# Patient Record
Sex: FEMALE | Race: OTHER | Employment: FULL TIME | URBAN - METROPOLITAN AREA
[De-identification: names, ages, dates, MRNs, and addresses within clinical notes are randomized per-mention and may not be internally consistent; named-entity substitution may affect disease eponyms.]

---

## 2019-05-31 ENCOUNTER — OFFICE VISIT (OUTPATIENT)
Dept: ENDOCRINOLOGY CLINIC | Facility: CLINIC | Age: 54
End: 2019-05-31
Payer: COMMERCIAL

## 2019-05-31 ENCOUNTER — APPOINTMENT (OUTPATIENT)
Dept: LAB | Facility: HOSPITAL | Age: 54
End: 2019-05-31
Attending: INTERNAL MEDICINE
Payer: COMMERCIAL

## 2019-05-31 VITALS — DIASTOLIC BLOOD PRESSURE: 77 MMHG | WEIGHT: 148 LBS | SYSTOLIC BLOOD PRESSURE: 110 MMHG | HEART RATE: 83 BPM

## 2019-05-31 DIAGNOSIS — E11.65 UNCONTROLLED TYPE 2 DIABETES MELLITUS WITH HYPERGLYCEMIA (HCC): ICD-10-CM

## 2019-05-31 DIAGNOSIS — E11.65 UNCONTROLLED TYPE 2 DIABETES MELLITUS WITH HYPERGLYCEMIA (HCC): Primary | ICD-10-CM

## 2019-05-31 PROCEDURE — 82570 ASSAY OF URINE CREATININE: CPT

## 2019-05-31 PROCEDURE — 84443 ASSAY THYROID STIM HORMONE: CPT

## 2019-05-31 PROCEDURE — 99244 OFF/OP CNSLTJ NEW/EST MOD 40: CPT | Performed by: INTERNAL MEDICINE

## 2019-05-31 PROCEDURE — 82043 UR ALBUMIN QUANTITATIVE: CPT

## 2019-05-31 PROCEDURE — 83036 HEMOGLOBIN GLYCOSYLATED A1C: CPT | Performed by: INTERNAL MEDICINE

## 2019-05-31 PROCEDURE — 82962 GLUCOSE BLOOD TEST: CPT | Performed by: INTERNAL MEDICINE

## 2019-05-31 PROCEDURE — 36416 COLLJ CAPILLARY BLOOD SPEC: CPT | Performed by: INTERNAL MEDICINE

## 2019-05-31 PROCEDURE — 80053 COMPREHEN METABOLIC PANEL: CPT

## 2019-05-31 PROCEDURE — 80061 LIPID PANEL: CPT

## 2019-05-31 PROCEDURE — 36415 COLL VENOUS BLD VENIPUNCTURE: CPT

## 2019-05-31 RX ORDER — LANCETS 33 GAUGE
EACH MISCELLANEOUS
Qty: 100 EACH | Refills: 3 | Status: SHIPPED | OUTPATIENT
Start: 2019-05-31 | End: 2019-06-07

## 2019-05-31 RX ORDER — SITAGLIPTIN 100 MG/1
100 TABLET, FILM COATED ORAL DAILY
Refills: 0 | COMMUNITY
Start: 2019-03-22 | End: 2019-05-31

## 2019-05-31 RX ORDER — METFORMIN HYDROCHLORIDE 500 MG/1
500 TABLET, EXTENDED RELEASE ORAL 2 TIMES DAILY WITH MEALS
Qty: 60 TABLET | Refills: 5 | Status: SHIPPED | OUTPATIENT
Start: 2019-05-31

## 2019-05-31 RX ORDER — BLOOD SUGAR DIAGNOSTIC
STRIP MISCELLANEOUS
Qty: 200 STRIP | Refills: 2 | Status: SHIPPED | OUTPATIENT
Start: 2019-05-31

## 2019-05-31 RX ORDER — GLIMEPIRIDE 4 MG/1
4 TABLET ORAL
Qty: 30 TABLET | Refills: 5 | Status: SHIPPED | OUTPATIENT
Start: 2019-05-31 | End: 2019-12-10

## 2019-05-31 RX ORDER — BLOOD SUGAR DIAGNOSTIC
STRIP MISCELLANEOUS
Qty: 100 STRIP | Refills: 1 | Status: SHIPPED | OUTPATIENT
Start: 2019-05-31 | End: 2019-06-07

## 2019-05-31 NOTE — PATIENT INSTRUCTIONS
STOP current medications    Start Invokana 300mg daily     Start Glimepiride 4mg daily    Start Metformin ER 500mg 2 times per day    Start Ozempic 0.25mg SQ weekly for one month then increase to 0.5mg SQ weekly     Follow up 6 weeks with nurse jana

## 2019-05-31 NOTE — PROGRESS NOTES
Name: Johana Simms  Date: 5/31/2019    Referring Physician: No ref. provider found    HISTORY OF PRESENT ILLNESS   Johana Simms is a 49year old female who presents for diabetes mellitus, diagnosed in 2004. She was previously treated by Dr. Mana Cadena.       TRIPP reviewed. No pertinent surgical history. PHYSICAL EXAM  /77   Pulse 83   Wt 148 lb (67.1 kg)     General Appearance:  alert, well developed, in no acute distress  Eyes:  normal conjunctivae, sclera. , normal sclera and normal pupils  Ears/Nose/Mo

## 2019-06-04 ENCOUNTER — TELEPHONE (OUTPATIENT)
Dept: ENDOCRINOLOGY CLINIC | Facility: CLINIC | Age: 54
End: 2019-06-04

## 2019-06-04 NOTE — TELEPHONE ENCOUNTER
Please call patient - labs demonstrate significant elevation of cholesterol levels. Please start atorvastatin 40mg PO QHS, #30, refill 6. The other labs were normal including normal kidney and liver function. Normal thyroid function. Thanks.

## 2019-06-07 ENCOUNTER — TELEPHONE (OUTPATIENT)
Dept: ENDOCRINOLOGY CLINIC | Facility: CLINIC | Age: 54
End: 2019-06-07

## 2019-06-07 RX ORDER — LANCETS 33 GAUGE
EACH MISCELLANEOUS
Qty: 200 EACH | Refills: 3 | Status: SHIPPED | OUTPATIENT
Start: 2019-06-07

## 2019-06-07 RX ORDER — BLOOD SUGAR DIAGNOSTIC
STRIP MISCELLANEOUS
Qty: 200 STRIP | Refills: 3 | Status: SHIPPED | OUTPATIENT
Start: 2019-06-07

## 2019-06-17 ENCOUNTER — HOSPITAL ENCOUNTER (OUTPATIENT)
Dept: ENDOCRINOLOGY | Facility: HOSPITAL | Age: 54
Discharge: HOME | End: 2019-06-17
Attending: INTERNAL MEDICINE
Payer: COMMERCIAL

## 2019-06-17 VITALS — WEIGHT: 151.69 LBS

## 2019-06-17 NOTE — PROGRESS NOTES
Caitlin Prabhakarfaith  : 1965 attended individual initial assessment for Diabetes Education:    Date: 2019   Start time: 174 End time: 1615      Assessment: Caitlin Whatley presents with referral from endocrinologist. Aryan Whittington recently seen by diann and was Initiated goal setting process and will continue to refine throughout class series. Recommendations:      Monitor blood glucose as directed. Follow Basic Diet Guidelines.    Attend Group Class series (4 classes)    Written materials provided for all are

## 2019-06-24 ENCOUNTER — TELEPHONE (OUTPATIENT)
Dept: ENDOCRINOLOGY CLINIC | Facility: CLINIC | Age: 54
End: 2019-06-24

## 2019-06-24 RX ORDER — ATORVASTATIN CALCIUM 40 MG/1
40 TABLET, FILM COATED ORAL NIGHTLY
Qty: 30 TABLET | Refills: 6 | Status: SHIPPED | OUTPATIENT
Start: 2019-06-24

## 2019-06-24 NOTE — TELEPHONE ENCOUNTER
Pt states she is at work and is unable to send BG update now. Pt will try to send BGs tonight via [address]. Pt states this morning Bf . RN advised should be below 120. Will await BG log from pt.

## 2019-06-24 NOTE — TELEPHONE ENCOUNTER
RN spoke with patient about note below from provider. Patient verbalized understanding tp start medication and does not have further questions at this time. Pt has apt w/ LG in July as well.

## 2019-06-24 NOTE — TELEPHONE ENCOUNTER
Please call patient - received note from educator that her sugars are still above goal.  Obtain readings to adjust medications. Thanks.

## 2019-06-26 ENCOUNTER — APPOINTMENT (OUTPATIENT)
Dept: ENDOCRINOLOGY | Facility: HOSPITAL | Age: 54
End: 2019-06-26
Attending: INTERNAL MEDICINE
Payer: COMMERCIAL

## 2019-06-27 NOTE — TELEPHONE ENCOUNTER
Pt states she will send sugars tonight. She states Bf  and Bd . RN advised to watch her bedtime snack - perhaps try low carb snack instead of fruit to see if morning sugars are less. Pt states she will try and let us know.     Will await BG log

## 2019-06-29 NOTE — TELEPHONE ENCOUNTER
Sugars are actually significantly improved compared to her education visit a few weeks ago. Also her medications are at max dose so lets continue current plan. Please schedule her for follow up in August or September. Thanks.

## 2019-07-01 NOTE — TELEPHONE ENCOUNTER
Spoke w/ Mira Collins. She is agreeable to CMP. She will call if BGs every start to increase on current medications.  Booked f/u Sept. 11.

## 2019-08-07 ENCOUNTER — APPOINTMENT (OUTPATIENT)
Dept: ENDOCRINOLOGY | Facility: HOSPITAL | Age: 54
End: 2019-08-07
Attending: INTERNAL MEDICINE
Payer: COMMERCIAL

## 2019-09-04 ENCOUNTER — HOSPITAL ENCOUNTER (OUTPATIENT)
Dept: ENDOCRINOLOGY | Facility: HOSPITAL | Age: 54
Discharge: HOME | End: 2019-09-04
Attending: INTERNAL MEDICINE
Payer: COMMERCIAL

## 2019-09-04 VITALS — WEIGHT: 150 LBS

## 2019-09-04 DIAGNOSIS — E11.65 UNCONTROLLED TYPE 2 DIABETES MELLITUS WITH HYPERGLYCEMIA (HCC): Primary | ICD-10-CM

## 2019-09-05 NOTE — PROGRESS NOTES
Theresa Jimmie  DOB2/28/1965 attended Step 2 Group Diabetes Education Class:    Date: 9/4/2019  Start time: 6:00 pm End time: 8:00 pm    Wt: 150 lb Weight change since start of program: -1.7 lbs    Diabetes Overview, pathophysiology, pre-diabetes, A1C result covered. Patient verbalized understanding and has no further questions at this time.     Omar Howard RN

## 2019-09-11 ENCOUNTER — HOSPITAL ENCOUNTER (OUTPATIENT)
Dept: ENDOCRINOLOGY | Facility: HOSPITAL | Age: 54
Discharge: HOME | End: 2019-09-11
Attending: INTERNAL MEDICINE
Payer: COMMERCIAL

## 2019-09-11 VITALS — WEIGHT: 148.63 LBS

## 2019-09-12 NOTE — PROGRESS NOTES
Drewpriscilla Faith  DOB2/28/1965 attended Step 3 Group Diabetes Education Class:    Date: 9/11/2019  Start time: 6p End time: 8p    Wt: 148 lb 9.6 oz Weight change since start of program: -3.1 lb    Blood Glucose: Not controlled: Progressing toward goal   Before

## 2019-09-18 ENCOUNTER — HOSPITAL ENCOUNTER (OUTPATIENT)
Dept: ENDOCRINOLOGY | Facility: HOSPITAL | Age: 54
Discharge: HOME | End: 2019-09-18
Attending: INTERNAL MEDICINE
Payer: COMMERCIAL

## 2019-09-18 VITALS — WEIGHT: 150.19 LBS

## 2019-09-18 DIAGNOSIS — E11.65 UNCONTROLLED TYPE 2 DIABETES MELLITUS WITH HYPERGLYCEMIA (HCC): Primary | ICD-10-CM

## 2019-09-19 NOTE — PROGRESS NOTES
Amarilis Castellano  DOB2/28/1965 attended Step 4 Group Diabetes Education Class:     Date: 9/18/2019  Start time: 6P End time: 8P    Wt: 150 lb 3.2 oz Weight change since start of program: -1.5#    Blood Glucose: Not controlled: Progressing toward goal   AMs con

## 2019-10-16 ENCOUNTER — APPOINTMENT (OUTPATIENT)
Dept: LAB | Age: 54
End: 2019-10-16
Attending: INTERNAL MEDICINE
Payer: COMMERCIAL

## 2019-10-16 ENCOUNTER — OFFICE VISIT (OUTPATIENT)
Dept: ENDOCRINOLOGY CLINIC | Facility: CLINIC | Age: 54
End: 2019-10-16
Payer: COMMERCIAL

## 2019-10-16 VITALS — DIASTOLIC BLOOD PRESSURE: 78 MMHG | SYSTOLIC BLOOD PRESSURE: 121 MMHG | HEART RATE: 87 BPM | WEIGHT: 145.81 LBS

## 2019-10-16 DIAGNOSIS — E11.65 UNCONTROLLED TYPE 2 DIABETES MELLITUS WITH HYPERGLYCEMIA (HCC): Primary | ICD-10-CM

## 2019-10-16 DIAGNOSIS — E11.65 UNCONTROLLED TYPE 2 DIABETES MELLITUS WITH HYPERGLYCEMIA (HCC): ICD-10-CM

## 2019-10-16 PROCEDURE — 99214 OFFICE O/P EST MOD 30 MIN: CPT | Performed by: INTERNAL MEDICINE

## 2019-10-16 PROCEDURE — 83036 HEMOGLOBIN GLYCOSYLATED A1C: CPT | Performed by: INTERNAL MEDICINE

## 2019-10-16 PROCEDURE — 36416 COLLJ CAPILLARY BLOOD SPEC: CPT | Performed by: INTERNAL MEDICINE

## 2019-10-16 PROCEDURE — 82962 GLUCOSE BLOOD TEST: CPT | Performed by: INTERNAL MEDICINE

## 2019-10-16 PROCEDURE — 80061 LIPID PANEL: CPT

## 2019-10-16 PROCEDURE — 80053 COMPREHEN METABOLIC PANEL: CPT

## 2019-10-16 PROCEDURE — 36415 COLL VENOUS BLD VENIPUNCTURE: CPT

## 2019-10-16 NOTE — PROGRESS NOTES
Name: Cisco Jewell  Date: 10/16/2019    Referring Physician: No ref. provider found    HISTORY OF PRESENT ILLNESS   Cisco Jewell is a 49year old female who presents for diabetes mellitus, diagnosed in 2004. She was previously treated by Dr. Rafat Morrell. Disp: 200 strip, Rfl: 2     Allergies:     Penicillins             OTHER (SEE COMMENTS)    Comment:unknown    Social History:   Social History    Socioeconomic History      Marital status: Single      Spouse name: Not on file      Number of children: Not o diet, recommend 45gm per meal or 135gm per day  -Continue Invokana 300mg PO daily   -Continue Glimepiride 4mg PO daily  -Continue Metformin ER 500mg PO BID  -Continue Ozempic 0.5mg SQ weekly  -Verbalized understanding of risks and benefits for above medica

## 2019-10-30 ENCOUNTER — HOSPITAL ENCOUNTER (OUTPATIENT)
Dept: ENDOCRINOLOGY | Facility: HOSPITAL | Age: 54
Discharge: HOME | End: 2019-10-30
Attending: INTERNAL MEDICINE
Payer: COMMERCIAL

## 2019-10-30 VITALS — WEIGHT: 147.19 LBS

## 2019-10-31 NOTE — PROGRESS NOTES
Amarilis Castellano  DOB2/28/1965 attended Step 5 Group Diabetes Education Class:    Date: 10/30/2019  Start time: 6p End time: 5p    Wt: 147 lb 3.2 oz Weight change since start of program: -4.5 lb    POC HbA1c: 7.5% 10/16/19    Blood Glucose: Controlled: Stabl

## 2019-12-11 RX ORDER — SEMAGLUTIDE 1.34 MG/ML
0.5 INJECTION, SOLUTION SUBCUTANEOUS WEEKLY
Qty: 1.5 ML | Refills: 5 | Status: SHIPPED | OUTPATIENT
Start: 2019-12-11

## 2019-12-11 RX ORDER — GLIMEPIRIDE 4 MG/1
4 TABLET ORAL
Qty: 30 TABLET | Refills: 5 | Status: SHIPPED | OUTPATIENT
Start: 2019-12-11

## 2023-01-26 ENCOUNTER — HOSPITAL ENCOUNTER (EMERGENCY)
Facility: HOSPITAL | Age: 58
Discharge: HOME | End: 2023-01-26
Attending: STUDENT IN AN ORGANIZED HEALTH CARE EDUCATION/TRAINING PROGRAM
Payer: COMMERCIAL

## 2023-01-26 ENCOUNTER — APPOINTMENT (OUTPATIENT)
Dept: GENERAL RADIOLOGY | Facility: HOSPITAL | Age: 58
End: 2023-01-26
Attending: STUDENT IN AN ORGANIZED HEALTH CARE EDUCATION/TRAINING PROGRAM
Payer: COMMERCIAL

## 2023-01-26 VITALS
SYSTOLIC BLOOD PRESSURE: 124 MMHG | HEART RATE: 79 BPM | OXYGEN SATURATION: 99 % | WEIGHT: 142 LBS | DIASTOLIC BLOOD PRESSURE: 77 MMHG | HEIGHT: 62 IN | BODY MASS INDEX: 26.13 KG/M2 | TEMPERATURE: 98 F | RESPIRATION RATE: 17 BRPM

## 2023-01-26 DIAGNOSIS — R07.9 CHEST PAIN OF UNCERTAIN ETIOLOGY: Primary | ICD-10-CM

## 2023-01-26 LAB
ANION GAP SERPL CALC-SCNC: 4 MMOL/L (ref 0–18)
ATRIAL RATE: 81 BPM
BASOPHILS # BLD AUTO: 0.03 X10(3) UL (ref 0–0.2)
BASOPHILS NFR BLD AUTO: 0.4 %
BUN BLD-MCNC: 11 MG/DL (ref 7–18)
BUN/CREAT SERPL: 15.5 (ref 10–20)
CALCIUM BLD-MCNC: 9.5 MG/DL (ref 8.5–10.1)
CHLORIDE SERPL-SCNC: 100 MMOL/L (ref 98–112)
CO2 SERPL-SCNC: 30 MMOL/L (ref 21–32)
CREAT BLD-MCNC: 0.71 MG/DL
DEPRECATED RDW RBC AUTO: 42.5 FL (ref 35.1–46.3)
EOSINOPHIL # BLD AUTO: 0.15 X10(3) UL (ref 0–0.7)
EOSINOPHIL NFR BLD AUTO: 2.2 %
ERYTHROCYTE [DISTWIDTH] IN BLOOD BY AUTOMATED COUNT: 13 % (ref 11–15)
GFR SERPLBLD BASED ON 1.73 SQ M-ARVRAT: 100 ML/MIN/1.73M2 (ref 60–?)
GLUCOSE BLD-MCNC: 376 MG/DL (ref 70–99)
HCT VFR BLD AUTO: 36.5 %
HGB BLD-MCNC: 12.5 G/DL
IMM GRANULOCYTES # BLD AUTO: 0.02 X10(3) UL (ref 0–1)
IMM GRANULOCYTES NFR BLD: 0.3 %
LYMPHOCYTES # BLD AUTO: 2 X10(3) UL (ref 1–4)
LYMPHOCYTES NFR BLD AUTO: 28.9 %
MCH RBC QN AUTO: 30.3 PG (ref 26–34)
MCHC RBC AUTO-ENTMCNC: 34.2 G/DL (ref 31–37)
MCV RBC AUTO: 88.6 FL
MONOCYTES # BLD AUTO: 0.43 X10(3) UL (ref 0.1–1)
MONOCYTES NFR BLD AUTO: 6.2 %
NEUTROPHILS # BLD AUTO: 4.28 X10 (3) UL (ref 1.5–7.7)
NEUTROPHILS # BLD AUTO: 4.28 X10(3) UL (ref 1.5–7.7)
NEUTROPHILS NFR BLD AUTO: 62 %
OSMOLALITY SERPL CALC.SUM OF ELEC: 293 MOSM/KG (ref 275–295)
P AXIS: 10 DEGREES
P-R INTERVAL: 154 MS
PLATELET # BLD AUTO: 257 10(3)UL (ref 150–450)
POTASSIUM SERPL-SCNC: 3.8 MMOL/L (ref 3.5–5.1)
Q-T INTERVAL: 380 MS
QRS DURATION: 78 MS
QTC CALCULATION (BEZET): 441 MS
R AXIS: 7 DEGREES
RBC # BLD AUTO: 4.12 X10(6)UL
SODIUM SERPL-SCNC: 134 MMOL/L (ref 136–145)
T AXIS: 25 DEGREES
TROPONIN I HIGH SENSITIVITY: 4 NG/L
TROPONIN I HIGH SENSITIVITY: 6 NG/L
TSI SER-ACNC: 0.71 MIU/ML (ref 0.36–3.74)
VENTRICULAR RATE: 81 BPM
WBC # BLD AUTO: 6.9 X10(3) UL (ref 4–11)

## 2023-01-26 PROCEDURE — 99284 EMERGENCY DEPT VISIT MOD MDM: CPT

## 2023-01-26 PROCEDURE — 84443 ASSAY THYROID STIM HORMONE: CPT | Performed by: STUDENT IN AN ORGANIZED HEALTH CARE EDUCATION/TRAINING PROGRAM

## 2023-01-26 PROCEDURE — 99285 EMERGENCY DEPT VISIT HI MDM: CPT

## 2023-01-26 PROCEDURE — 71045 X-RAY EXAM CHEST 1 VIEW: CPT | Performed by: STUDENT IN AN ORGANIZED HEALTH CARE EDUCATION/TRAINING PROGRAM

## 2023-01-26 PROCEDURE — 80048 BASIC METABOLIC PNL TOTAL CA: CPT | Performed by: STUDENT IN AN ORGANIZED HEALTH CARE EDUCATION/TRAINING PROGRAM

## 2023-01-26 PROCEDURE — 85025 COMPLETE CBC W/AUTO DIFF WBC: CPT | Performed by: STUDENT IN AN ORGANIZED HEALTH CARE EDUCATION/TRAINING PROGRAM

## 2023-01-26 PROCEDURE — 84484 ASSAY OF TROPONIN QUANT: CPT | Performed by: STUDENT IN AN ORGANIZED HEALTH CARE EDUCATION/TRAINING PROGRAM

## 2023-01-26 PROCEDURE — 93005 ELECTROCARDIOGRAM TRACING: CPT

## 2023-01-26 PROCEDURE — 36415 COLL VENOUS BLD VENIPUNCTURE: CPT

## 2023-01-26 PROCEDURE — 93010 ELECTROCARDIOGRAM REPORT: CPT

## 2023-01-26 RX ORDER — EMPAGLIFLOZIN, METFORMIN HYDROCHLORIDE 12.5; 1 MG/1; MG/1
2 TABLET, EXTENDED RELEASE ORAL DAILY
COMMUNITY

## 2023-01-26 NOTE — DISCHARGE INSTRUCTIONS
Please call your doctor to be set up with a Holter monitor    Please return to the emergency department if you develop severe and persistent chest pain,  difficulty breathing, dizziness, leg swelling or if you are coughing up blood as these can be signs  of a medical emergency. Please call your doctor for a follow up appointment in 2-3 days to  determine the need for further testing.

## 2023-01-26 NOTE — ED INITIAL ASSESSMENT (HPI)
Patient here with \"vibrations around my heart\" since yesterday and today states the left side of her face feels numb. FAST exam was negative. Denies shortness of breath, does state she'd had a sharp pain in the right shoulder blade.

## 2023-01-26 NOTE — ED QUICK NOTES
PT ambulatory to ED with spouse, reports chest \"vibrations\" starting yesterday at work when both walking and sitting, reports coming to ER today for worsening vibrations. Denies shortness of breath, FARNSWORTH, nausea, vomiting, blood in stool, and blood in urine.